# Patient Record
Sex: MALE | Race: ASIAN | NOT HISPANIC OR LATINO | URBAN - METROPOLITAN AREA
[De-identification: names, ages, dates, MRNs, and addresses within clinical notes are randomized per-mention and may not be internally consistent; named-entity substitution may affect disease eponyms.]

---

## 2018-08-03 ENCOUNTER — EMERGENCY (EMERGENCY)
Facility: HOSPITAL | Age: 33
LOS: 1 days | Discharge: HOME | End: 2018-08-03
Attending: EMERGENCY MEDICINE | Admitting: EMERGENCY MEDICINE
Payer: MEDICAID

## 2018-08-03 VITALS
DIASTOLIC BLOOD PRESSURE: 102 MMHG | TEMPERATURE: 99 F | OXYGEN SATURATION: 100 % | SYSTOLIC BLOOD PRESSURE: 149 MMHG | RESPIRATION RATE: 16 BRPM | HEART RATE: 98 BPM

## 2018-08-03 PROCEDURE — 99282 EMERGENCY DEPT VISIT SF MDM: CPT | Mod: 25

## 2018-08-03 NOTE — ED PROVIDER NOTE - ATTENDING CONTRIBUTION TO CARE
31 y/o otherwise healthy M with penile trauma.  Pt reports after intercourse approx 2 hours PTA, pt noted a "scratch" to the underside of his penis, under the foreskin that was bleeding profusely.  Deneis testicular pain or swelling, priapism, hematuria, bleeding from penile meatus.  Well but anxious appearing, sitting comfortably in stretcher, awake and alert, nontoxic.  VSS.  Normal testicular exam, no tenderness or swelling.  small superficial abrasion under foreskin, no laceration, no active bleeding or discharge.  DC home with supportive care, rec cleaning with soap and water, avoid trauma, outpatinet follow-up.

## 2018-08-03 NOTE — ED PROVIDER NOTE - PHYSICAL EXAMINATION
exam by Dr Domínguez chap by Dr Dubin:  not circumcised.  No testicular ttp.    No phimosios or paraphimosis  small not bleeding superficial abrasion on inf aspect of distal penis not involving glans  no penile dc  No CVAT  no inguinal LAD

## 2018-08-03 NOTE — ED PROVIDER NOTE - PLAN OF CARE
-- Please avoid any trauma to the injured area.  This includes intercourse.  -- Wash the area once daily.  Make sure area is dry & clean at all times.  -- Please follow up with your doctor(s) within the next 3 days, but seek medical care sooner if your symptoms worsen. Call for an appointment as soon as possible.  -- If you have worsening or concerning symptoms (inability to urinate, drainage from the wound, profuse bleeding, skin turning red/blue, see your doctor right away or return to the Emergency Department immediately.

## 2018-08-03 NOTE — ED PROVIDER NOTE - MEDICAL DECISION MAKING DETAILS
uncomplicated superficial abrasion without concern for urethral injury.  pt given anticipatory guidance, wound care, & advised to avoid trauma & intercourse until healed.

## 2018-08-03 NOTE — ED ADULT NURSE NOTE - OBJECTIVE STATEMENT
received to trA. c/o abrasion to underside of penis. not bleeding at present. denies testicular pain, penile discharge, dysuria or hematuria.  denies std history.

## 2018-08-03 NOTE — ED PROVIDER NOTE - CARE PLAN
Principal Discharge DX:	Penile abrasion, initial encounter  Assessment and plan of treatment:	-- Please avoid any trauma to the injured area.  This includes intercourse.  -- Wash the area once daily.  Make sure area is dry & clean at all times.  -- Please follow up with your doctor(s) within the next 3 days, but seek medical care sooner if your symptoms worsen. Call for an appointment as soon as possible.  -- If you have worsening or concerning symptoms (inability to urinate, drainage from the wound, profuse bleeding, skin turning red/blue, see your doctor right away or return to the Emergency Department immediately.

## 2018-08-03 NOTE — ED PROVIDER NOTE - OBJECTIVE STATEMENT
32y M no hx now p/w mild penile pain (soreness) & irritation following intercourse w/ a woman 2hr ago.  Pt st the site was bleeding initially from the "underside" of the penis near the glans which is normally covered by foreskin, but it stopped shortly thereafter. No testicular pain.  No penile dc, dysuria, hematiria.  No STD hx.

## 2018-08-03 NOTE — ED ADULT NURSE NOTE - NSIMPLEMENTINTERV_GEN_ALL_ED
Implemented All Universal Safety Interventions:  [place] to call system. Call bell, personal items and telephone within reach. Instruct patient to call for assistance. Room bathroom lighting operational. Non-slip footwear when patient is off stretcher. Physically safe environment: no spills, clutter or unnecessary equipment. Stretcher in lowest position, wheels locked, appropriate side rails in place.

## 2018-08-03 NOTE — ED ADULT TRIAGE NOTE - CHIEF COMPLAINT QUOTE
pt states noticed abrasion on penis near/under foreskin after intercourse 2 hours ago. states "feels sore." was bleeding earlier, no bleeding at present. denies medical hx.

## 2019-01-25 NOTE — ED PROVIDER NOTE - CPE EDP HEME LYMPH NORM
[de-identified] : Pt. is a 66 y/o male c/o of bilateral knee pain, right is worse than left since 2 years ago that has worsened over the past year. Pt. has right posterior knee pain and burning/pins/needles sensation on the front of his knee. He reports that his right knee has sharp, constant 10 out of 10 pain.  Pt. describes hearing/feeling a clicking and grinding sensation with flexion and extension of his right knee. Nothing makes his pain better. His pain is worse with stairs, sit to stand, and it is worse at night. Pt. has taken aspirin and advil with little improvement.
normal...

## 2019-02-27 ENCOUNTER — APPOINTMENT (OUTPATIENT)
Dept: DERMATOLOGY | Facility: CLINIC | Age: 34
End: 2019-02-27
Payer: MEDICAID

## 2019-02-27 DIAGNOSIS — L70.9 ACNE, UNSPECIFIED: ICD-10-CM

## 2019-02-27 DIAGNOSIS — Z78.9 OTHER SPECIFIED HEALTH STATUS: ICD-10-CM

## 2019-02-27 DIAGNOSIS — Z91.89 OTHER SPECIFIED PERSONAL RISK FACTORS, NOT ELSEWHERE CLASSIFIED: ICD-10-CM

## 2019-02-27 PROBLEM — Z00.00 ENCOUNTER FOR PREVENTIVE HEALTH EXAMINATION: Status: ACTIVE | Noted: 2019-02-27

## 2019-02-27 PROCEDURE — 99202 OFFICE O/P NEW SF 15 MIN: CPT

## 2019-02-27 RX ORDER — CLINDAMYCIN PHOSPHATE 10 MG/ML
1 LOTION TOPICAL DAILY
Qty: 1 | Refills: 5 | Status: ACTIVE | COMMUNITY
Start: 2019-02-27 | End: 1900-01-01

## 2021-10-07 NOTE — ED ADULT NURSE NOTE - NS ED NURSE LEVEL OF CONSCIOUSNESS MENTAL STATUS
- Patient with worsening agitation at his NH, unclear on cause, possibly 2/2 worsening dementia but as per cousin patient was in his USOH until recently and his current agitation is unusual for him  - CTH here nondiag, cousin reports that patient was recommended to have a MRI done as outpatient about 2 years ago (unclear on reason, cousin states it was for follow up of a prior CTH 6 months ago), has not been done yet, most recent CTH from April 2021 without any significant findings, currently sedated, neuro exam nonfocal but patient not fully cooperating with exam. Pt afebrile, BP stable, no leukocytosis, CXR showing no acute pathology  - Initial CTH nondiag, pt now with change in mental status, will obtain CTH  - TSH, free T4 levels wnl  - UA negative  - Ankle X-ray neg for acute osteomyelitis  - Elevated ESR, CRP  - CK elevation, suspect likely 2/2 his agitation; downtrending (658 -> 556)  - F/u B12, folate levels  - Neuro checks q8h  - Zyprexa prn for agitation  - If pt becomes febrile, will obtain blood cultures - Patient with worsening agitation at his NH, unclear on cause, possibly 2/2 worsening dementia but as per cousin patient was in his USOH until recently and his current agitation is unusual for him  - CTH here nondiag, cousin reports that patient was recommended to have a MRI done as outpatient about 2 years ago (unclear on reason, cousin states it was for follow up of a prior CTH 6 months ago), has not been done yet, most recent CTH from April 2021 without any significant findings, currently sedated, neuro exam nonfocal but patient not fully cooperating with exam. Pt afebrile, BP stable, no leukocytosis, CXR showing no acute pathology  - Repeat CTH limited by motion artifact, no gross signs of hemorrhage  - TSH, free T4 levels wnl  - UA negative, urine culture 50-90k Proteus mirabilis  - Ankle X-ray neg for acute osteomyelitis  - Elevated ESR, CRP  - Mildly elevated Procal (0.11)  - Repeat CXR showed mid lung opacity, unchanged from ED CXR  - F/u B12, folate levels  - Neuro checks q4h  - Zyprexa prn for agitation  - If pt becomes febrile, will obtain blood cultures - Patient with worsening agitation at his NH, unclear on cause, possibly 2/2 worsening dementia but as per cousin patient was in his USOH until recently and his current agitation is unusual for him  - CTH here nondiag, cousin reports that patient was recommended to have a MRI done as outpatient about 2 years ago (unclear on reason, cousin states it was for follow up of a prior CTH 6 months ago), has not been done yet, most recent CTH from April 2021 without any significant findings, currently sedated, neuro exam nonfocal but patient not fully cooperating with exam. Pt afebrile, BP stable, no leukocytosis, CXR showing no acute pathology  - Repeat CTH limited by motion artifact, no gross signs of hemorrhage  - TSH, free T4 levels wnl  - UA negative, urine culture 50-90k Proteus mirabilis  - Ankle X-ray neg for acute osteomyelitis  - Elevated ESR, CRP  - Mildly elevated Procal (0.11)  - Repeat CXR showed mid lung opacity, unchanged from ED CXR  - Given elevated inflammatory marker with CXR opacity and +urine cx, will treat empirically with ceftriaxone  - F/u B12, folate levels  - Neuro checks q4h  - Zyprexa prn for agitation  - If pt becomes febrile, will obtain blood cultures Cooperative/Awake/Alert

## 2025-07-05 NOTE — ED PROVIDER NOTE - PMH
In an effort to ensure that our patients [facility], a Team Member has reviewed your chart and identified an opportunity to provide the best care possible. An attempt was made to discuss or schedule due or overdue Preventive or Chronic Condition care.Care Gaps identified: Immunizations and Wellness Visits.    The Outcome was Contact was made, a Primary Care Visit was scheduled.   Type of Appointment needed: Primary Care Visit    No pertinent past medical history